# Patient Record
Sex: FEMALE | URBAN - METROPOLITAN AREA
[De-identification: names, ages, dates, MRNs, and addresses within clinical notes are randomized per-mention and may not be internally consistent; named-entity substitution may affect disease eponyms.]

---

## 2021-06-30 ENCOUNTER — HOSPITAL ENCOUNTER (OUTPATIENT)
Dept: CT IMAGING | Facility: HOSPITAL | Age: 71
Discharge: HOME | End: 2021-06-30
Attending: COLON & RECTAL SURGERY

## 2021-06-30 DIAGNOSIS — R93.3 ABNORMAL COLONOSCOPY: ICD-10-CM

## 2021-06-30 DIAGNOSIS — K63.5 COLON POLYPS: ICD-10-CM

## 2021-07-01 ENCOUNTER — HOSPITAL ENCOUNTER (OUTPATIENT)
Dept: CT IMAGING | Facility: HOSPITAL | Age: 71
Discharge: HOME | End: 2021-07-01
Attending: COLON & RECTAL SURGERY
Payer: COMMERCIAL

## 2021-07-01 PROCEDURE — G1004 CDSM NDSC: HCPCS

## 2021-07-01 PROCEDURE — 74261 CT COLONOGRAPHY DX: CPT

## 2023-08-04 ENCOUNTER — HOSPITAL ENCOUNTER (EMERGENCY)
Facility: HOSPITAL | Age: 73
Discharge: HOME | End: 2023-08-04
Attending: EMERGENCY MEDICINE
Payer: COMMERCIAL

## 2023-08-04 ENCOUNTER — APPOINTMENT (EMERGENCY)
Dept: CT IMAGING | Facility: HOSPITAL | Age: 73
End: 2023-08-04
Payer: COMMERCIAL

## 2023-08-04 ENCOUNTER — OFFICE VISIT (OUTPATIENT)
Age: 73
End: 2023-08-04
Payer: COMMERCIAL

## 2023-08-04 VITALS
BODY MASS INDEX: 20.73 KG/M2 | DIASTOLIC BLOOD PRESSURE: 80 MMHG | WEIGHT: 129 LBS | RESPIRATION RATE: 18 BRPM | HEIGHT: 66 IN | SYSTOLIC BLOOD PRESSURE: 124 MMHG

## 2023-08-04 VITALS
HEART RATE: 86 BPM | RESPIRATION RATE: 18 BRPM | DIASTOLIC BLOOD PRESSURE: 85 MMHG | TEMPERATURE: 98.2 F | SYSTOLIC BLOOD PRESSURE: 180 MMHG | OXYGEN SATURATION: 94 %

## 2023-08-04 DIAGNOSIS — K59.00 CONSTIPATION: Primary | ICD-10-CM

## 2023-08-04 DIAGNOSIS — R14.0 ABDOMINAL DISTENSION (GASEOUS): ICD-10-CM

## 2023-08-04 DIAGNOSIS — K59.00 OBSTIPATION: Primary | ICD-10-CM

## 2023-08-04 DIAGNOSIS — R10.84 GENERALIZED ABDOMINAL PAIN: ICD-10-CM

## 2023-08-04 DIAGNOSIS — K76.89 HEPATIC CYST: ICD-10-CM

## 2023-08-04 LAB
ALBUMIN SERPL BCP-MCNC: 4.1 G/DL (ref 3.5–5)
ALP SERPL-CCNC: 66 U/L (ref 34–104)
ALT SERPL W P-5'-P-CCNC: 13 U/L (ref 7–52)
ANION GAP SERPL CALCULATED.3IONS-SCNC: 7 MMOL/L
AST SERPL W P-5'-P-CCNC: 15 U/L (ref 13–39)
BASOPHILS # BLD AUTO: 0.06 THOUSANDS/ÂΜL (ref 0–0.1)
BASOPHILS NFR BLD AUTO: 1 % (ref 0–1)
BILIRUB SERPL-MCNC: 0.56 MG/DL (ref 0.2–1)
BUN SERPL-MCNC: 20 MG/DL (ref 5–25)
CALCIUM SERPL-MCNC: 9.4 MG/DL (ref 8.4–10.2)
CHLORIDE SERPL-SCNC: 106 MMOL/L (ref 96–108)
CO2 SERPL-SCNC: 25 MMOL/L (ref 21–32)
CREAT SERPL-MCNC: 0.8 MG/DL (ref 0.6–1.3)
EOSINOPHIL # BLD AUTO: 0.13 THOUSAND/ÂΜL (ref 0–0.61)
EOSINOPHIL NFR BLD AUTO: 1 % (ref 0–6)
ERYTHROCYTE [DISTWIDTH] IN BLOOD BY AUTOMATED COUNT: 13.7 % (ref 11.6–15.1)
GFR SERPL CREATININE-BSD FRML MDRD: 73 ML/MIN/1.73SQ M
GLUCOSE SERPL-MCNC: 87 MG/DL (ref 65–140)
HCT VFR BLD AUTO: 47.2 % (ref 34.8–46.1)
HGB BLD-MCNC: 15.7 G/DL (ref 11.5–15.4)
IMM GRANULOCYTES # BLD AUTO: 0.03 THOUSAND/UL (ref 0–0.2)
IMM GRANULOCYTES NFR BLD AUTO: 0 % (ref 0–2)
LIPASE SERPL-CCNC: <6 U/L (ref 11–82)
LYMPHOCYTES # BLD AUTO: 1.8 THOUSANDS/ÂΜL (ref 0.6–4.47)
LYMPHOCYTES NFR BLD AUTO: 16 % (ref 14–44)
MAGNESIUM SERPL-MCNC: 2.4 MG/DL (ref 1.9–2.7)
MCH RBC QN AUTO: 30.5 PG (ref 26.8–34.3)
MCHC RBC AUTO-ENTMCNC: 33.3 G/DL (ref 31.4–37.4)
MCV RBC AUTO: 92 FL (ref 82–98)
MONOCYTES # BLD AUTO: 0.77 THOUSAND/ÂΜL (ref 0.17–1.22)
MONOCYTES NFR BLD AUTO: 7 % (ref 4–12)
NEUTROPHILS # BLD AUTO: 8.49 THOUSANDS/ÂΜL (ref 1.85–7.62)
NEUTS SEG NFR BLD AUTO: 75 % (ref 43–75)
NRBC BLD AUTO-RTO: 0 /100 WBCS
PLATELET # BLD AUTO: 232 THOUSANDS/UL (ref 149–390)
PMV BLD AUTO: 11.1 FL (ref 8.9–12.7)
POTASSIUM SERPL-SCNC: 4.1 MMOL/L (ref 3.5–5.3)
PROT SERPL-MCNC: 7.2 G/DL (ref 6.4–8.4)
RBC # BLD AUTO: 5.14 MILLION/UL (ref 3.81–5.12)
SODIUM SERPL-SCNC: 138 MMOL/L (ref 135–147)
WBC # BLD AUTO: 11.28 THOUSAND/UL (ref 4.31–10.16)

## 2023-08-04 PROCEDURE — 83735 ASSAY OF MAGNESIUM: CPT | Performed by: EMERGENCY MEDICINE

## 2023-08-04 PROCEDURE — 85025 COMPLETE CBC W/AUTO DIFF WBC: CPT | Performed by: EMERGENCY MEDICINE

## 2023-08-04 PROCEDURE — 36415 COLL VENOUS BLD VENIPUNCTURE: CPT | Performed by: EMERGENCY MEDICINE

## 2023-08-04 PROCEDURE — 99283 EMERGENCY DEPT VISIT LOW MDM: CPT

## 2023-08-04 PROCEDURE — 99213 OFFICE O/P EST LOW 20 MIN: CPT | Performed by: COLON & RECTAL SURGERY

## 2023-08-04 PROCEDURE — 99285 EMERGENCY DEPT VISIT HI MDM: CPT | Performed by: EMERGENCY MEDICINE

## 2023-08-04 PROCEDURE — 80053 COMPREHEN METABOLIC PANEL: CPT | Performed by: EMERGENCY MEDICINE

## 2023-08-04 PROCEDURE — 83690 ASSAY OF LIPASE: CPT | Performed by: EMERGENCY MEDICINE

## 2023-08-04 PROCEDURE — 96360 HYDRATION IV INFUSION INIT: CPT

## 2023-08-04 PROCEDURE — 74177 CT ABD & PELVIS W/CONTRAST: CPT

## 2023-08-04 RX ORDER — BISACODYL 10 MG
10 SUPPOSITORY, RECTAL RECTAL ONCE
Status: COMPLETED | OUTPATIENT
Start: 2023-08-04 | End: 2023-08-04

## 2023-08-04 RX ORDER — ENEMA 19; 7 G/133ML; G/133ML
1 ENEMA RECTAL ONCE
Status: COMPLETED | OUTPATIENT
Start: 2023-08-04 | End: 2023-08-04

## 2023-08-04 RX ADMIN — IOHEXOL 100 ML: 350 INJECTION, SOLUTION INTRAVENOUS at 18:13

## 2023-08-04 RX ADMIN — BISACODYL 10 MG: 10 SUPPOSITORY RECTAL at 21:16

## 2023-08-04 RX ADMIN — SODIUM PHOSPHATE, DIBASIC AND SODIUM PHOSPHATE, MONOBASIC 1 ENEMA: 7; 19 ENEMA RECTAL at 20:59

## 2023-08-04 RX ADMIN — SODIUM CHLORIDE 1000 ML: 0.9 INJECTION, SOLUTION INTRAVENOUS at 17:19

## 2023-08-04 NOTE — ED PROVIDER NOTES
Pt Name: Luana Cherry  MRN: [id number]  [address] 1950  Age/Sex: 68 y.o. female  Date of evaluation: 8/4/2023  PCP: Yuli Capone MD    [address]    Chief Complaint   Patient presents with   • Constipation     No bowel movement for 1 week, sent in by gastroenterologist to rule out obstruction. Patient reports generalized abdominal pain with "cramping" intermittently. Denies any diarrhea or vomiting. HPI and MDM    68 y.o. female presenting with constipation for the last week. Patient states she has not eaten anything over the last 2 days. States she tried to suppositories and magnesium citrate without any relief. Denies any history abdominal surgeries. She went and saw colorectal surgery today, states that he did a rectal exam and there is no impaction or stool in the rectal vault. She states that she was advised to come to the emergency department with concern for bowel obstruction. No history of bowel obstruction, states she passes a very little gas. Has nausea, no vomiting. Patient has lower abdominal pain. Differential diagnosis considered includes but not limited to constipation, ileus, colitis, diverticulitis, small bowel obstruction, large bowel obstruction. I reviewed Dr. Don Olivo note from earlier today, patient with abdominal distention and obstipation, no bowel movement for a week. No evidence of fecal impaction on digital rectal exam.  Had colonoscopy done in 2021 that was incomplete, very tortuous colon and extensive diverticular disease. Sent to the emergency department with concern for possible large intestinal obstruction. Work-up reveals moderate amount of stool throughout the colon consistent with constipation, no obvious evidence of obstruction. Patient updated with all results. She was given an enema and Dulcolax suppository in the emergency department, did have a bowel movement after that although it was not very large.     She was prescribed GoLytely, advise repeat follow-up with Dr. Natalio Larkin, return precautions discussed. Also advised PCP follow-up for incidental findings. Medications   sodium chloride 0.9 % bolus 1,000 mL (0 mL Intravenous Stopped 8/4/23 1819)   iohexol (OMNIPAQUE) 350 MG/ML injection (SINGLE-DOSE) 100 mL (100 mL Intravenous Given 8/4/23 1813)   sodium phosphate-biphosphate (FLEET) enema 1 enema (1 enema Rectal Given 8/4/23 2059)   bisacodyl (DULCOLAX) rectal suppository 10 mg (10 mg Rectal Given 8/4/23 2116)         Past Medical and Surgical History    History reviewed. No pertinent past medical history. History reviewed. No pertinent surgical history. History reviewed. No pertinent family history. Social History     Tobacco Use   • Smoking status: Every Day     Packs/day: 1.00     Types: Cigarettes   • Smokeless tobacco: Never   Vaping Use   • Vaping Use: Never used   Substance Use Topics   • Alcohol use: Not Currently   • Drug use: Never           Allergies    No Known Allergies    Home Medications    Prior to Admission medications    Not on File           Physical Exam      ED Triage Vitals [08/04/23 1606]   Temperature Pulse Respirations Blood Pressure SpO2   98.2 °F (36.8 °C) 85 16 160/72 96 %      Temp src Heart Rate Source Patient Position - Orthostatic VS BP Location FiO2 (%)   -- Monitor Sitting Left arm --      Pain Score       --               Physical Exam  Constitutional:       General: She is not in acute distress. Appearance: She is not ill-appearing. HENT:      Head: Normocephalic and atraumatic. Nose: Nose normal.      Mouth/Throat:      Mouth: Mucous membranes are moist.   Eyes:      Extraocular Movements: Extraocular movements intact. Pupils: Pupils are equal, round, and reactive to light. Cardiovascular:      Rate and Rhythm: Normal rate and regular rhythm. Pulmonary:      Effort: No respiratory distress. Breath sounds: Normal breath sounds. No wheezing. Abdominal:      General: There is no distension. Palpations: Abdomen is soft. Tenderness: There is no abdominal tenderness. Musculoskeletal:         General: No swelling or deformity. Normal range of motion. Cervical back: Normal range of motion and neck supple. Skin:     General: Skin is warm. Findings: No erythema. Neurological:      Mental Status: She is alert and oriented to person, place, and time. Mental status is at baseline.               Diagnostic Results      Labs:    Results Reviewed     Procedure Component Value Units Date/Time    Comprehensive metabolic panel [[id number]] Collected: 08/04/23 1717    Lab Status: Final result Specimen: Blood from Arm, Left Updated: 08/04/23 1801     Sodium 138 mmol/L      Potassium 4.1 mmol/L      Chloride 106 mmol/L      CO2 25 mmol/L      ANION GAP 7 mmol/L      BUN 20 mg/dL      Creatinine 0.80 mg/dL      Glucose 87 mg/dL      Calcium 9.4 mg/dL      AST 15 U/L      ALT 13 U/L      Alkaline Phosphatase 66 U/L      Total Protein 7.2 g/dL      Albumin 4.1 g/dL      Total Bilirubin 0.56 mg/dL      eGFR 73 ml/min/1.73sq m     Narrative:      [place] guidelines for Chronic Kidney Disease (CKD):   •  Stage 1 with normal or high GFR (GFR > 90 mL/min/1.73 square meters)  •  Stage 2 Mild CKD (GFR = 60-89 mL/min/1.73 square meters)  •  Stage 3A Moderate CKD (GFR = 45-59 mL/min/1.73 square meters)  •  Stage 3B Moderate CKD (GFR = 30-44 mL/min/1.73 square meters)  •  Stage 4 Severe CKD (GFR = 15-29 mL/min/1.73 square meters)  •  Stage 5 End Stage CKD (GFR <15 mL/min/1.73 square meters)  Note: GFR calculation is accurate only with a steady state creatinine    Lipase [[id number]]  (Abnormal) Collected: 08/04/23 1717    Lab Status: Final result Specimen: Blood from Arm, Left Updated: 08/04/23 1801     Lipase <6 u/L     Magnesium [[id number]]  (Normal) Collected: 08/04/23 1717    Lab Status: Final result Specimen: Blood from Arm, Left Updated: 08/04/23 1801     Magnesium 2.4 mg/dL     CBC and differential [[id number]]  (Abnormal) Collected: 08/04/23 1717    Lab Status: Final result Specimen: Blood from Arm, Left Updated: 08/04/23 1727     WBC 11.28 Thousand/uL      RBC 5.14 Million/uL      Hemoglobin 15.7 g/dL      Hematocrit 47.2 %      MCV 92 fL      MCH 30.5 pg      MCHC 33.3 g/dL      RDW 13.7 %      MPV 11.1 fL      Platelets 546 Thousands/uL      nRBC 0 /100 WBCs      Neutrophils Relative 75 %      Immat GRANS % 0 %      Lymphocytes Relative 16 %      Monocytes Relative 7 %      Eosinophils Relative 1 %      Basophils Relative 1 %      Neutrophils Absolute 8.49 Thousands/µL      Immature Grans Absolute 0.03 Thousand/uL      Lymphocytes Absolute 1.80 Thousands/µL      Monocytes Absolute 0.77 Thousand/µL      Eosinophils Absolute 0.13 Thousand/µL      Basophils Absolute 0.06 Thousands/µL           All labs reviewed and utilized in the medical decision making process    Radiology:    CT abdomen pelvis with contrast   Final Result      Moderate amount of stool seen scattered throughout the colon suggestive of constipation. Hepatic cyst. Additional 9 mm hypodense lesion in the left hepatic lobe, indeterminate. A dedicated liver protocol MRI study without and with intravenous gadolinium may be helpful for further characterization. Stable right adrenal gland adenoma. Limited evaluation of the uterus. Cannot exclude endometrial stripe thickening. This can be further evaluated with pelvic ultrasound. Additional findings as above.             Workstation performed: [id number]             All radiology studies independently viewed by me and interpreted by the radiologist.    Procedure    Procedures        FINAL IMPRESSION    Final diagnoses:   Constipation   Hepatic cyst         DISPOSITION    Time reflects when diagnosis was documented in both MDM as applicable and the Disposition within this note     Time User Action Codes Description Comment    8/4/2023  9:57 PM Margretta Pro Add [K59.00] Constipation     8/4/2023  9:58 PM Margretta Pro Add [K76.89] Hepatic cyst       ED Disposition     ED Disposition   Discharge    Condition   Stable    Date/Time   Fri Aug 4, 2023  9:57 PM    Comment   Tangela Julian discharge to home/self care. Follow-up Information     Follow up With Specialties Details Why Contact Info    Aiden Browning MD Colon and Rectal Surgery Call in 1 day  [address] [facility]      Mike Lynn MD Family Medicine Call in 1 day  [address] [facility], [address] [place] [address] [address] TO:    Aiden Browning MD  [address]  [facility] [address] To [facility]  [phone]    Call in 1 day      Mike Lynn MD  [address] [place] [address], [address] [place] [address] [phone]    Call in 1 day        DISCHARGE MEDICATIONS:    Discharge Medication List as of 8/4/2023  9:59 PM      START taking these medications    Details   polyethylene glycol (GOLYTELY) 4000 mL solution Take 4,000 mL by mouth once for 1 dose, Starting Fri 8/4/2023, Normal             No discharge procedures on file. Vivi Robison DO        This note was partially completed using voice recognition technology, and was scanned for gross errors; however some errors may still exist. Please contact the author with any questions or requests for clarification.       Vivi Robison DO  08/04/23 [id number]

## 2023-08-04 NOTE — PROGRESS NOTES
Assessment/Plan:  Assessment:    The patient is with abdominal distention obstipation has not had a bowel movement for a week. Digital rectal exam revealed no evidence of a fecal impaction. The patient had a colonoscopy done in 2021 that was incomplete follow-up virtual colonoscopy was done she had a very tortuous colon and extensive diverticular disease with limited distensibility. Plan:    Patient advised to go to the [facility] emergency room A[facility] and have a diagnostic work-up for large intestinal obstruction possibly related to the abnormal area on previous virtual colonoscopy. Patient was understanding of recommendations patient will be taken there by her daughter       Diagnoses and all orders for this visit:    Obstipation    Generalized abdominal pain    Abdominal distension (gaseous)          Subjective:      Patient ID: Monica Hernandez is a 68 y.o. female. Patient is a 67-year-old woman with 1 week history of obstipation mild nausea no fevers temperature chills no vomiting. Patient has had no solid food since Wednesday minimal flatus per rectum. Patient with previous incomplete colonoscopy follow-up with virtual colonoscopy showing tortuous colon extensive diverticular disease possible stricture due to limited distensibility with air during that procedure. This procedure was done in 2021      The following portions of the patient's history were reviewed and updated as appropriate: allergies, current medications, past family history, past medical history, past social history, past surgical history and problem list.    Review of Systems   Constitutional: Negative for chills and fever. HENT: Negative for ear pain and sore throat. Eyes: Negative for pain and visual disturbance. Respiratory: Negative for cough and shortness of breath. Cardiovascular: Negative for chest pain and palpitations. Gastrointestinal: Positive for abdominal distention, abdominal pain and constipation. Negative for vomiting. Genitourinary: Negative for dysuria and hematuria. Musculoskeletal: Negative for arthralgias and back pain. Skin: Negative for color change and rash. Neurological: Negative for seizures and syncope. All other systems reviewed and are negative. Objective:      /80   Resp 18   Ht 5' 6" (1.676 m)   Wt 58.5 kg (129 lb)   BMI 20.82 kg/m²          Physical Exam  Constitutional:       Appearance: Normal appearance. She is normal weight. HENT:      Head: Normocephalic. Eyes:      Conjunctiva/sclera: Conjunctivae normal.   Cardiovascular:      Rate and Rhythm: Regular rhythm. Heart sounds: Normal heart sounds. Pulmonary:      Breath sounds: Normal breath sounds. Abdominal:      General: There is distension. Musculoskeletal:      Cervical back: Neck supple. Skin:     General: Skin is warm and dry. Neurological:      Mental Status: She is alert and oriented to person, place, and time. Psychiatric:         Mood and Affect: Mood normal.         Behavior: Behavior normal.         Thought Content: Thought content normal.           Anorectal exam:    Digital rectal exam reveals an absence of stool in the rectal vault excluding fecal impaction.

## 2023-08-05 NOTE — DISCHARGE INSTRUCTIONS
Your uterine stripe was found to be thickened on CT scan, please follow this up and other CT scan findings such as liver cyst with your family doctor. Return to the emergency department for any new or worsening symptoms.

## 2023-08-29 ENCOUNTER — OFFICE VISIT (OUTPATIENT)
Age: 73
End: 2023-08-29
Payer: COMMERCIAL

## 2023-08-29 VITALS
BODY MASS INDEX: 19.93 KG/M2 | HEIGHT: 66 IN | SYSTOLIC BLOOD PRESSURE: 121 MMHG | WEIGHT: 124 LBS | OXYGEN SATURATION: 95 % | DIASTOLIC BLOOD PRESSURE: 65 MMHG | HEART RATE: 80 BPM

## 2023-08-29 DIAGNOSIS — K59.04 CHRONIC IDIOPATHIC CONSTIPATION: Primary | ICD-10-CM

## 2023-08-29 PROCEDURE — 99213 OFFICE O/P EST LOW 20 MIN: CPT | Performed by: COLON & RECTAL SURGERY

## 2023-08-29 NOTE — PROGRESS NOTES
Assessment/Plan:  Assessment standing history of chronic functional constipation . patient declines Linzess therapy    Plan:    Citrucel caplets tablets p.o. twice daily    Magnesium 400 mg daily1 tablet daily    MiraLAX 1 capful daily may increase to twice per day if necessary       There are no diagnoses linked to this encounter. Subjective:      Patient ID: Evan Piper is a 68 y.o. female. Patient is a 27-year-old woman who comes in today for recent check concerning treatment for chronic constipation. During the patient's last office visit she had severe abdominal pain was referred to the emergency room CAT scan was done showed no mechanical obstruction but severe constipation. The patient presents today for additional recommendations to try to prevent this from recurring. Patient has had constipation issues since age 2. The patient had a virtual colonoscopy as her colonoscopy was not able to be completed by me 2021. This showed severe diverticular disease muscular hypertrophy but no evidence of neoplastic change. The following portions of the patient's history were reviewed and updated as appropriate: allergies, current medications, past family history, past medical history, past social history, past surgical history and problem list.    Review of Systems   Constitutional: Negative for chills and fever. HENT: Negative for ear pain and sore throat. Eyes: Negative for pain and visual disturbance. Respiratory: Negative for cough and shortness of breath. Cardiovascular: Negative for chest pain and palpitations. Gastrointestinal: Positive for constipation. Negative for abdominal pain and vomiting. Genitourinary: Negative for dysuria and hematuria. Musculoskeletal: Negative for arthralgias and back pain. Skin: Negative for color change and rash. Neurological: Negative for seizures and syncope. All other systems reviewed and are negative.         Objective:      /65 Pulse 80   Ht 5' 6" (1.676 m)   Wt 56.2 kg (124 lb)   SpO2 95%   BMI 20.01 kg/m²          Physical Exam  Vitals and nursing note reviewed. Constitutional:       Appearance: Normal appearance. Abdominal:      General: Bowel sounds are normal.      Palpations: Abdomen is soft. Neurological:      Mental Status: She is alert and oriented to person, place, and time. Psychiatric:         Mood and Affect: Mood normal.         Behavior: Behavior normal.         Thought Content:  Thought content normal.

## 2024-05-16 ENCOUNTER — TELEPHONE (OUTPATIENT)
Age: 74
End: 2024-05-16